# Patient Record
(demographics unavailable — no encounter records)

---

## 2024-10-21 NOTE — REASON FOR VISIT
[Subsequent Evaluation] : a subsequent evaluation for [FreeTextEntry2] : acute otitis externa of right ear and cellulitis of right ear

## 2024-10-21 NOTE — HISTORY OF PRESENT ILLNESS
[FreeTextEntry1] : Patient presents acute otitis externa of right ear and cellulitis of right ear. He states he is doing well and his ears are no longer bothering him. However, he believes he may have a throat infections due to discomfort. No further compolaints

## 2024-10-21 NOTE — ASSESSMENT
[FreeTextEntry1] : ear eczema under control.  Patient having a viral URI. Advised to hydrate and use saline.  RTC as needed.

## 2025-01-22 NOTE — HISTORY OF PRESENT ILLNESS
[FreeTextEntry1] : Patient returns today following up on ear checkup. States that he has some ear discomfort. Occasionally has clogging sensation. Denies pain or discomfort. States no signs of infection at this time. Here today for ear checkup.  Also complaining of needing to clear his throat repetitively. no dysphonia or dysphagia.

## 2025-01-22 NOTE — ASSESSMENT
[FreeTextEntry1] : Wax found and cleaned. Cleaning well tolerated by patient. Patient felt improvement in cloginess after cleaning.  I discussed the pathophysiology of acid reflux and its effect on the laryngo-pharynx. I explained the need to first control the diet as much as possible with having early dinners, avoiding certain types of food in the evening including coffee, tomato sauce, chocolate, garlic... I also explained the need for medication when needed. I also discussed the effect and safety profile of the medication.

## 2025-01-22 NOTE — PHYSICAL EXAM
[de-identified] : right impacted wax cleaned with curette [Normal] : mucosa is normal [Midline] : trachea located in midline position

## 2025-01-22 NOTE — PROCEDURE
[Complicated Symptoms] : complicated symptoms requiring more thorough examination than provided by mirror [Globus] : globus [Flexible Endoscope] : examined with the flexible endoscope [Arytenoid Erythema ___ /4] : arytenoid erythema [unfilled]U/4

## 2025-05-21 NOTE — PHYSICAL EXAM
[Normal] : mucosa is normal [Midline] : trachea located in midline position [de-identified] : cerumen impaction in both ears removed with microsuction.

## 2025-05-21 NOTE — HISTORY OF PRESENT ILLNESS
[FreeTextEntry1] : Patient following up today for bilateral impacted cerumen, clogged ears. Hear for check up. Feel like ears are itchy. No complaints of otalgia or otorrhea.

## 2025-05-21 NOTE — REASON FOR VISIT
[Subsequent Evaluation] : a subsequent evaluation for [FreeTextEntry2] : bilateral impacted cerumen, clogged ears

## 2025-05-21 NOTE — ASSESSMENT
[FreeTextEntry1] : Wax found and cleaned. Cleaning well tolerated by patient. Patient felt improvement in cloginess after cleaning.  Reflux under control. continue dietary precautions.